# Patient Record
Sex: MALE | Race: WHITE
[De-identification: names, ages, dates, MRNs, and addresses within clinical notes are randomized per-mention and may not be internally consistent; named-entity substitution may affect disease eponyms.]

---

## 2020-01-27 ENCOUNTER — HOSPITAL ENCOUNTER (INPATIENT)
Dept: HOSPITAL 75 - ER | Age: 55
LOS: 1 days | Discharge: HOME | DRG: 378 | End: 2020-01-28
Attending: FAMILY MEDICINE | Admitting: FAMILY MEDICINE
Payer: COMMERCIAL

## 2020-01-27 VITALS — SYSTOLIC BLOOD PRESSURE: 170 MMHG | DIASTOLIC BLOOD PRESSURE: 52 MMHG

## 2020-01-27 VITALS — HEIGHT: 70 IN | BODY MASS INDEX: 23.8 KG/M2 | WEIGHT: 166.23 LBS

## 2020-01-27 VITALS — DIASTOLIC BLOOD PRESSURE: 71 MMHG | SYSTOLIC BLOOD PRESSURE: 110 MMHG

## 2020-01-27 VITALS — SYSTOLIC BLOOD PRESSURE: 106 MMHG | DIASTOLIC BLOOD PRESSURE: 67 MMHG

## 2020-01-27 VITALS — DIASTOLIC BLOOD PRESSURE: 65 MMHG | SYSTOLIC BLOOD PRESSURE: 103 MMHG

## 2020-01-27 VITALS — SYSTOLIC BLOOD PRESSURE: 92 MMHG | DIASTOLIC BLOOD PRESSURE: 73 MMHG

## 2020-01-27 VITALS — DIASTOLIC BLOOD PRESSURE: 70 MMHG | SYSTOLIC BLOOD PRESSURE: 106 MMHG

## 2020-01-27 VITALS — SYSTOLIC BLOOD PRESSURE: 100 MMHG | DIASTOLIC BLOOD PRESSURE: 71 MMHG

## 2020-01-27 VITALS — SYSTOLIC BLOOD PRESSURE: 117 MMHG | DIASTOLIC BLOOD PRESSURE: 74 MMHG

## 2020-01-27 VITALS — SYSTOLIC BLOOD PRESSURE: 103 MMHG | DIASTOLIC BLOOD PRESSURE: 70 MMHG

## 2020-01-27 VITALS — SYSTOLIC BLOOD PRESSURE: 110 MMHG | DIASTOLIC BLOOD PRESSURE: 79 MMHG

## 2020-01-27 VITALS — SYSTOLIC BLOOD PRESSURE: 113 MMHG | DIASTOLIC BLOOD PRESSURE: 71 MMHG

## 2020-01-27 DIAGNOSIS — F17.210: ICD-10-CM

## 2020-01-27 DIAGNOSIS — K21.9: ICD-10-CM

## 2020-01-27 DIAGNOSIS — F10.10: ICD-10-CM

## 2020-01-27 DIAGNOSIS — D64.9: ICD-10-CM

## 2020-01-27 DIAGNOSIS — K57.31: ICD-10-CM

## 2020-01-27 DIAGNOSIS — D62: ICD-10-CM

## 2020-01-27 DIAGNOSIS — K29.71: Primary | ICD-10-CM

## 2020-01-27 DIAGNOSIS — K63.5: ICD-10-CM

## 2020-01-27 DIAGNOSIS — Z79.1: ICD-10-CM

## 2020-01-27 DIAGNOSIS — K64.8: ICD-10-CM

## 2020-01-27 LAB
ALBUMIN SERPL-MCNC: 4 GM/DL (ref 3.2–4.5)
ALP SERPL-CCNC: 51 U/L (ref 40–136)
ALT SERPL-CCNC: 10 U/L (ref 0–55)
APTT BLD: 23 SEC (ref 24–35)
APTT PPP: YELLOW S
BACTERIA #/AREA URNS HPF: NEGATIVE /HPF
BASOPHILS # BLD AUTO: 0.1 10^3/UL (ref 0–0.1)
BASOPHILS NFR BLD AUTO: 1 % (ref 0–10)
BILIRUB SERPL-MCNC: 0.2 MG/DL (ref 0.1–1)
BILIRUB UR QL STRIP: NEGATIVE
BUN/CREAT SERPL: 25
CALCIUM SERPL-MCNC: 9.1 MG/DL (ref 8.5–10.1)
CHLORIDE SERPL-SCNC: 109 MMOL/L (ref 98–107)
CO2 SERPL-SCNC: 24 MMOL/L (ref 21–32)
CREAT SERPL-MCNC: 0.87 MG/DL (ref 0.6–1.3)
EOSINOPHIL # BLD AUTO: 0.1 10^3/UL (ref 0–0.3)
EOSINOPHIL NFR BLD AUTO: 2 % (ref 0–10)
ERYTHROCYTE [DISTWIDTH] IN BLOOD BY AUTOMATED COUNT: 18.2 % (ref 10–14.5)
FIBRINOGEN PPP-MCNC: CLEAR MG/DL
GFR SERPLBLD BASED ON 1.73 SQ M-ARVRAT: > 60 ML/MIN
GLUCOSE SERPL-MCNC: 143 MG/DL (ref 70–105)
GLUCOSE UR STRIP-MCNC: NEGATIVE MG/DL
HCT VFR BLD CALC: 25 % (ref 40–54)
HGB BLD-MCNC: 7.2 G/DL (ref 13.3–17.7)
INR PPP: 0.9 (ref 0.8–1.4)
KETONES UR QL STRIP: NEGATIVE
LEUKOCYTE ESTERASE UR QL STRIP: NEGATIVE
LYMPHOCYTES # BLD AUTO: 1.2 X 10^3 (ref 1–4)
LYMPHOCYTES NFR BLD AUTO: 16 % (ref 12–44)
MANUAL DIFFERENTIAL PERFORMED BLD QL: NO
MCH RBC QN AUTO: 22 PG (ref 25–34)
MCHC RBC AUTO-ENTMCNC: 29 G/DL (ref 32–36)
MCV RBC AUTO: 77 FL (ref 80–99)
MONOCYTES # BLD AUTO: 0.7 X 10^3 (ref 0–1)
MONOCYTES NFR BLD AUTO: 10 % (ref 0–12)
NEUTROPHILS # BLD AUTO: 5.4 X 10^3 (ref 1.8–7.8)
NEUTROPHILS NFR BLD AUTO: 72 % (ref 42–75)
NITRITE UR QL STRIP: NEGATIVE
OTHER ELEMENTS URNS MICRO: (no result) /HPF
PH UR STRIP: 5.5 [PH] (ref 5–9)
PLATELET # BLD: 411 10^3/UL (ref 130–400)
PMV BLD AUTO: 9.4 FL (ref 7.4–10.4)
POTASSIUM SERPL-SCNC: 3.7 MMOL/L (ref 3.6–5)
PROT SERPL-MCNC: 6.6 GM/DL (ref 6.4–8.2)
PROT UR QL STRIP: NEGATIVE
PROTHROMBIN TIME: 12.8 SEC (ref 12.2–14.7)
RBC #/AREA URNS HPF: (no result) /HPF
SODIUM SERPL-SCNC: 141 MMOL/L (ref 135–145)
SP GR UR STRIP: >=1.03 (ref 1.02–1.02)
WBC # BLD AUTO: 7.6 10^3/UL (ref 4.3–11)
WBC #/AREA URNS HPF: (no result) /HPF

## 2020-01-27 PROCEDURE — 88305 TISSUE EXAM BY PATHOLOGIST: CPT

## 2020-01-27 PROCEDURE — 96374 THER/PROPH/DIAG INJ IV PUSH: CPT

## 2020-01-27 PROCEDURE — 85610 PROTHROMBIN TIME: CPT

## 2020-01-27 PROCEDURE — 81000 URINALYSIS NONAUTO W/SCOPE: CPT

## 2020-01-27 PROCEDURE — 87081 CULTURE SCREEN ONLY: CPT

## 2020-01-27 PROCEDURE — 96361 HYDRATE IV INFUSION ADD-ON: CPT

## 2020-01-27 PROCEDURE — 86900 BLOOD TYPING SEROLOGIC ABO: CPT

## 2020-01-27 PROCEDURE — 85730 THROMBOPLASTIN TIME PARTIAL: CPT

## 2020-01-27 PROCEDURE — 36415 COLL VENOUS BLD VENIPUNCTURE: CPT

## 2020-01-27 PROCEDURE — 86901 BLOOD TYPING SEROLOGIC RH(D): CPT

## 2020-01-27 PROCEDURE — 80053 COMPREHEN METABOLIC PANEL: CPT

## 2020-01-27 PROCEDURE — 86850 RBC ANTIBODY SCREEN: CPT

## 2020-01-27 PROCEDURE — 85025 COMPLETE CBC W/AUTO DIFF WBC: CPT

## 2020-01-27 PROCEDURE — 86920 COMPATIBILITY TEST SPIN: CPT

## 2020-01-27 RX ADMIN — DEXTROSE MONOHYDRATE, SODIUM CHLORIDE, AND POTASSIUM CHLORIDE SCH MLS/HR: 50; 4.5; 1.49 INJECTION, SOLUTION INTRAVENOUS at 13:34

## 2020-01-27 RX ADMIN — DEXTROSE MONOHYDRATE, SODIUM CHLORIDE, AND POTASSIUM CHLORIDE SCH MLS/HR: 50; 4.5; 1.49 INJECTION, SOLUTION INTRAVENOUS at 19:59

## 2020-01-27 RX ADMIN — PANTOPRAZOLE SODIUM SCH MG: 40 INJECTION, POWDER, FOR SOLUTION INTRAVENOUS at 21:09

## 2020-01-27 RX ADMIN — NICOTINE SCH MG: 21 PATCH, EXTENDED RELEASE TRANSDERMAL at 12:35

## 2020-01-27 RX ADMIN — ASCORBIC ACID, VITAMIN A PALMITATE, CHOLECALCIFEROL, THIAMINE HYDROCHLORIDE, RIBOFLAVIN-5 PHOSPHATE SODIUM, PYRIDOXINE HYDROCHLORIDE, NIACINAMIDE, DEXPANTHENOL, ALPHA-TOCOPHEROL ACETATE, VITAMIN K1, FOLIC ACID, BIOTIN, CYANOCOBALAMIN SCH MLS/HR: 200; 3300; 200; 6; 3.6; 6; 40; 15; 10; 150; 600; 60; 5 INJECTION, SOLUTION INTRAVENOUS at 13:34

## 2020-01-27 NOTE — ED GI
General


Chief Complaint:  Abdominal/GI Problems


Stated Complaint:  WEAKNESS,BLACK STOOLS


Nursing Triage Note:  


Pt reports black stools yesterday and severe fatigue and weakness today.  Pt 


reports history of heavy whiskey drinking off and on for the past ten years.  Pt




reports drinking 1 pint of whiskey most days.  Pt denies pain.


Sepsis Screen:  No Definite Risk


Source of Information:  Patient


Exam Limitations:  No Limitations





History of Present Illness


Date Seen by Provider:  Jan 27, 2020


Time Seen by Provider:  09:07


Initial Comments


This 54-year-old gentleman presents to the emergency room feeling severely weak 

and fatigued today.  He was having difficulty ambulating stairs which is unusual

for him.  He reports melena since last night.  He has had no bright red blood 

per rectum and no hematemesis.  He denies nausea, vomiting, or diarrhea.  He has

no shortness of breath.  He denies any significant health problems except "acid 

reflux and indigestion with possible hiatal hernia".  He does admit to drinking 

alcohol daily, usually about a pint of hard alcohol.  His last alcoholic 

beverage was January 24.  He has no local primary care provider and takes no 

medications except over-the-counter Gaviscon.  Gaviscon did not help this 

morning.  He is noted to be mildly tachycardic with a systolic blood pressure in

the 90s.





Allergies and Home Medications


Allergies


Coded Allergies:  


     No Known Drug Allergies (Unverified , 8/11/10)





Home Medications


Lansoprazole 15 Mg Capsule.dr, 15 MG PO DAILY, (Reported)





Patient Home Medication List


Home Medication List Reviewed:  Yes





Review of Systems


Review of Systems


Constitutional:  see HPI


EENTM:  No Symptoms Reported


Respiratory:  No Symptoms Reported


Cardiovascular:  No Symptoms Reported


Gastrointestinal:  See HPI


Genitourinary:  No Symptoms Reported


Musculoskeletal:  no symptoms reported


Skin:  no symptoms reported


Psychiatric/Neurological:  See HPI


Endocrine:  No Symptoms Reported





Past Medical-Social-Family Hx


Past Med/Social Hx:  Reviewed and Corrections made


Patient Social History


Alcohol Use:  Regular Use


Alcohol Beverage of Choice:  Whiskey


Recreational Drug Use:  No


Smoking Status:  Current Everyday Smoker


Type Used:  Cigarettes


2nd Hand Smoke Exposure:  Yes


Recent Foreign Travel:  No


Contact w/Someone Who Travel:  No


Recent Infectious Disease Expo:  No


Recent Hopitalizations:  No





Past Medical History


Surgeries:  Yes (wisdom teeth)


Respiratory:  No


Cardiac:  No


Neurological:  No


Genitourinary:  No


Gastrointestinal:  Yes


Gastroesophageal Reflux


Musculoskeletal:  No


Endocrine:  No


HEENT:  No


Cancer:  No


Psychosocial:  No


Integumentary:  No


Blood Disorders:  No





Physical Exam


Vital Signs





Vital Signs - First Documented








 1/27/20





 09:00


 


Temp 36.7


 


Pulse 103


 


Resp 17


 


B/P (MAP) 113/86 (95)


 


Pulse Ox 100


 


O2 Delivery Room Air





Capillary Refill : Less Than 3 Seconds


Height/Weight/BMI


Height: '"


Weight: lbs. oz. kg; 23.00 BMI


Method:


General Appearance:  WD/WN


HEENT:  PERRL/EOMI, normal ENT inspection


Neck:  normal inspection


Respiratory:  lungs clear, normal breath sounds, no respiratory distress, no 

accessory muscle use


Cardiovascular:  regular rate, rhythm, no edema, no murmur


Gastrointestinal:  normal bowel sounds, non tender, soft


Extremities:  normal inspection, no pedal edema


Neurologic/Psychiatric:  CNs II-XII nml as tested, no motor/sensory deficits, 

alert, normal mood/affect, oriented x 3


Skin:  normal color, warm/dry





Progress/Results/Core Measures


Results/Orders


Lab Results





Laboratory Tests








Test


 1/27/20


09:15 1/27/20


09:35 Range/Units


 


 


White Blood Count


 7.6 


 


 4.3-11.0


10^3/uL


 


Red Blood Count


 3.26 L


 


 4.35-5.85


10^6/uL


 


Hemoglobin 7.2 L  13.3-17.7  G/DL


 


Hematocrit 25 L  40-54  %


 


Mean Corpuscular Volume 77 L  80-99  FL


 


Mean Corpuscular Hemoglobin 22 L  25-34  PG


 


Mean Corpuscular Hemoglobin


Concent 29 L


 


 32-36  G/DL





 


Red Cell Distribution Width 18.2 H  10.0-14.5  %


 


Platelet Count


 411 H


 


 130-400


10^3/uL


 


Mean Platelet Volume 9.4   7.4-10.4  FL


 


Neutrophils (%) (Auto) 72   42-75  %


 


Lymphocytes (%) (Auto) 16   12-44  %


 


Monocytes (%) (Auto) 10   0-12  %


 


Eosinophils (%) (Auto) 2   0-10  %


 


Basophils (%) (Auto) 1   0-10  %


 


Neutrophils # (Auto) 5.4   1.8-7.8  X 10^3


 


Lymphocytes # (Auto) 1.2   1.0-4.0  X 10^3


 


Monocytes # (Auto) 0.7   0.0-1.0  X 10^3


 


Eosinophils # (Auto)


 0.1 


 


 0.0-0.3


10^3/uL


 


Basophils # (Auto)


 0.1 


 


 0.0-0.1


10^3/uL


 


Prothrombin Time 12.8   12.2-14.7  SEC


 


INR Comment 0.9   0.8-1.4  


 


Activated Partial


Thromboplast Time 23 L


 


 24-35  SEC





 


Sodium Level 141   135-145  MMOL/L


 


Potassium Level 3.7   3.6-5.0  MMOL/L


 


Chloride Level 109 H    MMOL/L


 


Carbon Dioxide Level 24   21-32  MMOL/L


 


Anion Gap 8   5-14  MMOL/L


 


Blood Urea Nitrogen 22 H  7-18  MG/DL


 


Creatinine


 0.87 


 


 0.60-1.30


MG/DL


 


Estimat Glomerular Filtration


Rate > 60 


 


  





 


BUN/Creatinine Ratio 25    


 


Glucose Level 143 H    MG/DL


 


Calcium Level 9.1   8.5-10.1  MG/DL


 


Corrected Calcium 9.1   8.5-10.1  MG/DL


 


Total Bilirubin 0.2   0.1-1.0  MG/DL


 


Aspartate Amino Transf


(AST/SGOT) 11 


 


 5-34  U/L





 


Alanine Aminotransferase


(ALT/SGPT) 10 


 


 0-55  U/L





 


Alkaline Phosphatase 51     U/L


 


Total Protein 6.6   6.4-8.2  GM/DL


 


Albumin 4.0   3.2-4.5  GM/DL


 


Urine Color  YELLOW   


 


Urine Clarity  CLEAR   


 


Urine pH  5.5  5-9  


 


Urine Specific Gravity  >=1.030  1.016-1.022  


 


Urine Protein  NEGATIVE  NEGATIVE  


 


Urine Glucose (UA)  NEGATIVE  NEGATIVE  


 


Urine Ketones  NEGATIVE  NEGATIVE  


 


Urine Nitrite  NEGATIVE  NEGATIVE  


 


Urine Bilirubin  NEGATIVE  NEGATIVE  


 


Urine Urobilinogen  0.2  < = 1.0  MG/DL


 


Urine Leukocyte Esterase  NEGATIVE  NEGATIVE  


 


Urine RBC (Auto)  NEGATIVE  NEGATIVE  


 


Urine RBC  RARE   /HPF


 


Urine WBC  RARE   /HPF


 


Urine Crystals  NONE   /LPF


 


Urine Bacteria  NEGATIVE   /HPF


 


Urine Casts  NONE   /LPF


 


Urine Mucus  SMALL H  /LPF


 


Urine Other  FEW SPERM H  /HPF


 


Urine Culture Indicated  NO   








My Orders





Orders - VICK STOLL MD


Cbc With Automated Diff (1/27/20 09:07)


Comprehensive Metabolic Panel (1/27/20 09:07)


Protime With Inr (1/27/20 09:07)


Partial Thromboplastin Time (1/27/20 09:07)


Ed Iv/Invasive Line Start (1/27/20 09:07)


Ua Culture If Indicated (1/27/20 09:07)


Red Cells Leukocytes Reduced (1/27/20 09:39)


Type And Screen (1/27/20 09:39)


Ns Iv 500 Ml (Sodium Chloride 0.9%) (1/27/20 10:26)





Medications Given in ED





Vital Signs/I&O











 1/27/20





 09:00


 


Temp 36.7


 


Pulse 103


 


Resp 17


 


B/P (MAP) 113/86 (95)


 


Pulse Ox 100


 


O2 Delivery Room Air














Blood Pressure Mean:                    95











Progress


Progress Note :  


   Time:  10:40


Progress Note


Patient was found to be severely anemic with a hemoglobin of 7.2.  Because of 

his mild tachycardia, marginal blood pressure, and symptoms, it was deemed 

appropriate to transfuse.  He will be transfused one unit of packed red blood 

cells and one unit will be held.  I spoke with Dr. Gonzales who ideally would like

to prep him to day and perform both upper and lower endoscopy tomorrow.  

Protonix will be given in the ER.  Patient is being admitted to the hospital 

service at Dr. Gonzales's request.





Departure


Communication (Admissions)


Time/Spoke to Admitting Phy:  10:35


Dr. Barajas


Time/Spoke to Consulting Phy:  10:15


Dr. Gonzales





Impression





   Primary Impression:  


   Severe anemia


   Additional Impressions:  


   Melena


   Generalized weakness


Disposition:  09 ADMITTED AS INPATIENT


Condition:  Stable





Admissions


Decision to Admit Reason:  Admit from ER (General)


Decision to Admit/Date:  Jan 27, 2020


Time/Decision to Admit Time:  10:15





Departure-Patient Inst.


Referrals:  


NO,LOCAL PHYSICIAN (PCP/Family)


Primary Care Physician











VICK STOLL MD        Jan 27, 2020 10:32

## 2020-01-27 NOTE — NUR
SPOKE WITH THE PT TO UPDATE THE MED REC.



PT SAYS HE DOES NOT TAKE ANY PRESCRIPTION MEDICATIONS AND THE ONLY THING HE TAKES ARE OTC'S.



OTC MEDS:

PRILOSEC

IBUPROFEN

## 2020-01-27 NOTE — NUR
THERE WAS AN ERROR IN ADMISSION PART A VITALS THAT WERE CHARTED. 

THE CORRECTED VITALS ARE:

BLOOD PRESSURE 106/67  RIGHT ARM

HEART RATE 96

TEMP. 36.0 C

RESP  18

SPO2 98&

PAIN 0

## 2020-01-27 NOTE — NUR
ANABEL GUTHRIE admitted to room 417-1, with an admitting diagnosis of MELENA AND SEVERE 
ANEMIA, on 01/27/20 from ED via [facility], accompanied by ED STAFF. ANABEL GUTHRIE introduced 
to surroundings, call light, bed controls, phone, TV, temperature control, lights, meal 
times, smoking policy, visitor policy, side rail policy, bathrooms and showers.  Patient 
Rights given to patient in the handbook. ANABEL GUTHRIE verbalizes understanding that [facility] is not responsible for the loss or damage to any personal effects or valuables that 
are kept in the patients possession during their hospitalization.  The following Patient 
Care Plans were discussed with the PATIENT: Discharge Planning, GASTROINTESTINAL BLEED, 
ANEMIA and KNOWLEDGE DEFICIT. ANABEL GUTHRIE verbalizes understanding of Interdisciplinary 
Patient Education. Patient and/or family were informed about the Rapid Response Team and its 
purpose.

## 2020-01-27 NOTE — HISTORY & PHYSICAL-HOSPITALIST
History of Present Illness


HPI/Chief Complaint


Patient is 54-year-old  male with past medical history of daily alcohol

use who presented to the emergency department with chief complaint of low energy

and black stools.  He states he has had black stools in the past but has never 

had this worked up.  He noticed that his stool yesterday morning was black.  He 

was fatigued at that time but upon awakening this morning could hardly take a 

few steps due to the fatigue.  He has since had another black stool.  Found to 

have a hemoglobin of 7 and was admitted to the hospital for GI bleed.  He 

believes he may be slightly pale but is unsure.


Source:  patient


Exam Limitations:  no limitations


Date Seen


20


Time Seen by a Provider:  15:33


Attending Physician


Samuel Barajas MD


PCP


No,Local Physician


Referring Physician





Date of Admission


2020 at 10:33





Home Medications & Allergies


Home Medications


Reviewed patient Home Medication Reconciliation performed by pharmacy medication

reconciliations technician and/or nursing.


Patients Allergies have been reviewed.





Allergies





Allergies


Coded Allergies


  No Known Drug Allergies (Unverified8/11/10)








Past Medical-Social-Family Hx


Past Med/Social Hx:  Reviewed Nursing Past Med/Soc Hx, Reviewed and Corrections 

made


Patient Social History


Alcohol Use:  Regular Use


Alcohol Beverage of Choice:  Whiskey


Recreational Drug Use:  No


Smoking Status:  Current Everyday Smoker


Type Used:  Cigarettes


2nd Hand Smoke Exposure:  Yes


Recent Foreign Travel:  No


Contact w/other who traveled:  No


Recent Hopitalizations:  No


Recent Infectious Disease Expo:  No





Past Medical History


Gastrointestinal:  Gastroesophageal Reflux


History of Blood Disorders:  No





Family History





Cataracts


  19 MOTHER


Deafness or hearing loss


  19 FATHER


Diabetes mellitus


  19 FATHER


Headache disorder


  19 MOTHER


Diabetes (Father), Other Conditions/Hx (Mother had Hiatal hernia surgery)





Review of Systems


Constitutional:  malaise, weakness


EENTM:  no symptoms reported


Cardiovascular:  no symptoms reported


Gastrointestinal:  No hematemesis; heartburn, melena; No nausea, No vomiting


Genitourinary:  no symptoms reported


Musculoskeletal:  no symptoms reported


Skin:  see HPI


Psychiatric/Neurological:  No Symptoms Reported





Physical Exam


Physical Exam


Vital Signs





Vital Signs - First Documented








 20





 09:00


 


Temp 36.7


 


Pulse 103


 


Resp 17


 


B/P (MAP) 113/86 (95)


 


Pulse Ox 100


 


O2 Delivery Room Air





Capillary Refill : Less Than 3 Seconds


Height, Weight, BMI


Height: '"


Weight: lbs. oz. kg; 23.85 BMI


Method:


General Appearance:  No Apparent Distress, WD/WN


Eyes:  Bilateral Eye Conjunctivae Pale


HEENT:  Moist Mucous Membranes; No Scleral Icterus (L), No Scleral Icterus (R)


Neck:  Normal Inspection, Supple


Respiratory:  Lungs Clear, No Accessory Muscle Use, No Respiratory Distress


Cardiovascular:  Regular Rate, Rhythm, No Murmur


Gastrointestinal:  Normal Bowel Sounds, Non Tender, Soft


Extremity:  Normal Capillary Refill, No Calf Tenderness, No Pedal Edema


Neurologic/Psychiatric:  Alert, Oriented x3, Normal Mood/Affect; No Aphasia, No 

Facial Droop


Skin:  Warm/Dry, Pallor; No Petechia





Results


Results/Procedures


Labs


Laboratory Tests


20 09:15








20 05:09








Patient resulted labs reviewed.





Assessment/Plan


Admission Diagnosis


GI Bleed


Admission Status:  Inpatient Order (span 2 midnights)


Reason for Inpatient Admission:  


severe anemia, blood transfusion, needs colonoscopy





Assessment and Plan


GI Bleed


   s/p 1 unit pRBCs


   Goal Hgb >8 since continuing to bleed


   Surgery consulted, appreciate recs


   Plan for scope tomorrow


   Hemodynamically stable at this time





Alcohol abuse


   CIWA protocol


   No history of withdrawal


   Drinks 1 pt/night





Tobacco abuse


   Recommended cessation


   Was able to quit for 9 years but has been smoking for 13 years now


   Nicotine patch





Diagnosis/Problems


Diagnosis/Problems





(1) Tobacco abuse


Status:  Chronic


(2) Alcohol abuse


Status:  Chronic


(3) GI bleed


Status:  Acute


Qualifiers:  


   GI bleed type/associated pathology:  unspecified gastrointestinal hemorrhage 

type  Qualified Codes:  K92.2 - Gastrointestinal hemorrhage, unspecified


(4) Severe anemia


Status:  Acute


(5) Melena


Status:  Acute


(6) Generalized weakness


Status:  Acute





Clinical Quality Measures


DVT/VTE Risk/Contraindication:


Risk Factor Score Per Nursin


RFS Level Per Nursing on Admit:  2=Moderate











SAMUEL BARAJAS MD              2020 15:23

## 2020-01-27 NOTE — CONSULTATION - SURGERY
History of Present Illness


History of Present Illness


Patient Consulted On(byron/time)


1/27/20


 13:55


Time Seen by Provider:  13:24


History of Present Illness


Surgery asked to consult regarding Anemia and Melena.





HPI per ED:  Pt reports black stools yesterday and severe fatigue and weakness 

today.  Pt reports history of heavy whiskey drinking off and on for the past ten

years.  Pt reports drinking 1 pint of whiskey most days.  Pt denies pain.





This 54-year-old gentleman presents to the emergency room feeling severely weak 

and fatigued today.  He was having difficulty ambulating stairs which is unusual

for him.  He reports melena since last night.  He has had no bright red blood 

per rectum and no hematemesis.  He denies nausea, vomiting, or diarrhea.  He has

no shortness of breath.  He denies any significant health problems except "acid 

reflux and indigestion with possible hiatal hernia".  He does admit to drinking 

alcohol daily, usually about a pint of hard alcohol.  His last alcoholic 

beverage was January 24.  He has no local primary care provider and takes no 

medications except over-the-counter Gaviscon.  Gaviscon did not help this 

morning.  He is noted to be mildly tachycardic with a systolic blood pressure in

the 90s.





When I spoke to pt this afternoon he states he has ever had black BM's  before 

this and doesn't remember a time when he felt this weak.  States he had a 

colonoscopy and Barium swallow in his 30's because "of acid reflux."  Pt denies 

abdominal pain.





Allergies and Home Medications


Allergies


Coded Allergies:  


     No Known Drug Allergies (Unverified , 8/11/10)





Home Medications


Ibuprofen 200 Mg Tablet, 400 MG PO Q8H PRN for PAIN-MILD (1-4), (Reported)


Lansoprazole 15 Mg Capsule.dr, 15 MG PO DAILY, (Reported)





Patient Home Medication List


Home Medication List Reviewed:  Yes





Past Medical-Social-Family Hx


Patient Social History


Alcohol Use:  Regular Use


Recreational Drug Use:  No


Smoking Status:  Current Everyday Smoker


Type Used:  Cigarettes


2nd Hand Smoke Exposure:  Yes


Recent Foreign Travel:  No


Contact w/Someone Who Travel:  No


Recent Infectious Disease Expo:  No


Recent Hopitalizations:  No





Surgeries


History of Surgeries:  Yes (wisdom teeth)





Respiratory


History of Respiratory Disorde:  No





Cardiovascular


History of Cardiac Disorders:  No





Neurological


History of Neurological Disord:  No





Genitourinary


History of Genitourinary Disor:  No





Gastrointestinal


History of Gastrointestinal Di:  Yes


Gastrointestinal Disorders:  Gastroesophageal Reflux





Musculoskeletal


History of Musculoskeletal Dis:  No





Endocrine


History of Endocrine Disorders:  No





HEENT


History of HEENT Disorders:  No





Cancer


History of Cancer:  No





Psychosocial


History of Psychiatric Problem:  No





Integumentary


History of Skin or Integumenta:  No





Blood Transfusions


History of Blood Disorders:  No





Family Medical History


Significant Family History:  Diabetes (Father), Other Conditions/Hx (Mother had 

Hiatal hernia surgery)





Review of Systems-General


Constitutional:  malaise, weakness


EENTM:  No blurred vision, No double vision, No mouth pain, No mouth swelling, 

No epistaxis, No throat swelling


Respiratory:  No cough; dyspnea on exertion; No hemoptysis


Cardiovascular:  No chest pain, No edema, No palpitations


Gastrointestinal:  No abdominal pain, No jaundice; melena; No nausea, No 

vomiting


Genitourinary:  No dysuria, No frequency, No hematuria


Musculoskeletal:  No joint pain, No joint swelling, No muscle stiffness


Skin:  No change in color, No change in hair/nails


Psychiatric/Neurological:  Denies Anxiety, Denies Depressed, Denies Seizure, 

Denies Tremors


Other


pt denies any hx of abnormal bleeding or bruising.





Physical Exam-General Problems


Physical Exam


Vital Signs





Vital Signs - First Documented








 1/27/20





 09:00


 


Temp 36.7


 


Pulse 103


 


Resp 17


 


B/P (MAP) 113/86 (95)


 


Pulse Ox 100


 


O2 Delivery Room Air





Capillary Refill : Less Than 3 Seconds


General Appearance:  WD/WN, no apparent distress


Eyes:  Bilateral Eye PERRL, Bilateral Eye EOMI


HEENT:  pharynx normal; No scleral icterus (R), No scleral icterus (L)


Neck:  non-tender, full range of motion, supple


Respiratory:  chest non-tender, lungs clear, normal breath sounds, no 

respiratory distress, no accessory muscle use


Cardiovascular:  regular rate, rhythm, no edema, no murmur


Gastrointestinal:  normal bowel sounds, non tender, soft, no organomegaly, no 

pulsatile mass


Back:  no CVA tenderness, no vertebral tenderness


Extremities:  normal range of motion, non-tender, normal inspection, no pedal 

edema, no calf tenderness, normal capillary refill


Neurologic/Psychiatric:  CNs II-XII nml as tested, no motor/sensory deficits, 

alert, normal mood/affect, oriented x 3


Skin:  normal color, warm/dry


Lymphatic:  no adenopathy (neck, axilla or groin)





Data Review


Labs


Laboratory Tests


1/27/20 09:15: 


White Blood Count 7.6, Red Blood Count 3.26L, Hemoglobin 7.2L, Hematocrit 25L, 

Mean Corpuscular Volume 77L, Mean Corpuscular Hemoglobin 22L, Mean Corpuscular 

Hemoglobin Concent 29L, Red Cell Distribution Width 18.2H, Platelet Count 411H, 

Mean Platelet Volume 9.4, Neutrophils (%) (Auto) 72, Lymphocytes (%) (Auto) 16, 

Monocytes (%) (Auto) 10, Eosinophils (%) (Auto) 2, Basophils (%) (Auto) 1, Francine

trophils # (Auto) 5.4, Lymphocytes # (Auto) 1.2, Monocytes # (Auto) 0.7, 

Eosinophils # (Auto) 0.1, Basophils # (Auto) 0.1, Prothrombin Time 12.8, INR 

Comment 0.9, Activated Partial Thromboplast Time 23L, Sodium Level 141, 

Potassium Level 3.7, Chloride Level 109H, Carbon Dioxide Level 24, Anion Gap 8, 

Blood Urea Nitrogen 22H, Creatinine 0.87, Estimat Glomerular Filtration Rate > 

60, BUN/Creatinine Ratio 25, Glucose Level 143H, Calcium Level 9.1, Corrected 

Calcium 9.1, Total Bilirubin 0.2, Aspartate Amino Transf (AST/SGOT) 11, Alanine 

Aminotransferase (ALT/SGPT) 10, Alkaline Phosphatase 51, Total Protein 6.6, 

Albumin 4.0


1/27/20 09:35: 


Urine Color YELLOW, Urine Clarity CLEAR, Urine pH 5.5, Urine Specific Gravity 

>=1.030, Urine Protein NEGATIVE, Urine Glucose (UA) NEGATIVE, Urine Ketones 

NEGATIVE, Urine Nitrite NEGATIVE, Urine Bilirubin NEGATIVE, Urine Urobilinogen 

0.2, Urine Leukocyte Esterase NEGATIVE, Urine RBC (Auto) NEGATIVE, Urine RBC 

RARE, Urine WBC RARE, Urine Crystals NONE, Urine Bacteria NEGATIVE, Urine Casts 

NONE, Urine Mucus SMALLH, Urine Other FEW SPERMH, Urine Culture Indicated NO





Assessment/Plan


Anemia 


Melena





Pt is getting IV fluids, received one unit of PRBC, anti-emetics, pain control 

as needed.  Prep for colonoscopy started now.  Plan is EGD/Colonoscopy tomorrow 

around noon.  Discussed the procedure with


pt; including risks and complications not limited to pain, bleeding, infection, 

intestinal perforation and esophageal perforation.  All questions answered to 

his satisfaction.  Will obtain consent.











LETICIA SALDANA DO               Jan 27, 2020 14:03

## 2020-01-27 NOTE — NUR
Delay in second set of vitals as spacelab would not take blood pressure.  This 
nurse had to obtain portable vital machine from fast track.

## 2020-01-28 VITALS — SYSTOLIC BLOOD PRESSURE: 98 MMHG | DIASTOLIC BLOOD PRESSURE: 52 MMHG

## 2020-01-28 VITALS — SYSTOLIC BLOOD PRESSURE: 99 MMHG | DIASTOLIC BLOOD PRESSURE: 57 MMHG

## 2020-01-28 VITALS — SYSTOLIC BLOOD PRESSURE: 113 MMHG | DIASTOLIC BLOOD PRESSURE: 72 MMHG

## 2020-01-28 VITALS — DIASTOLIC BLOOD PRESSURE: 73 MMHG | SYSTOLIC BLOOD PRESSURE: 116 MMHG

## 2020-01-28 VITALS — SYSTOLIC BLOOD PRESSURE: 101 MMHG | DIASTOLIC BLOOD PRESSURE: 63 MMHG

## 2020-01-28 VITALS — DIASTOLIC BLOOD PRESSURE: 67 MMHG | SYSTOLIC BLOOD PRESSURE: 101 MMHG

## 2020-01-28 VITALS — DIASTOLIC BLOOD PRESSURE: 54 MMHG | SYSTOLIC BLOOD PRESSURE: 90 MMHG

## 2020-01-28 VITALS — DIASTOLIC BLOOD PRESSURE: 72 MMHG | SYSTOLIC BLOOD PRESSURE: 113 MMHG

## 2020-01-28 LAB
ALBUMIN SERPL-MCNC: 3.6 GM/DL (ref 3.2–4.5)
ALP SERPL-CCNC: 42 U/L (ref 40–136)
ALT SERPL-CCNC: 12 U/L (ref 0–55)
BASOPHILS # BLD AUTO: 0 10^3/UL (ref 0–0.1)
BASOPHILS NFR BLD AUTO: 1 % (ref 0–10)
BILIRUB SERPL-MCNC: 0.3 MG/DL (ref 0.1–1)
BUN/CREAT SERPL: 10
CALCIUM SERPL-MCNC: 8.3 MG/DL (ref 8.5–10.1)
CHLORIDE SERPL-SCNC: 111 MMOL/L (ref 98–107)
CO2 SERPL-SCNC: 17 MMOL/L (ref 21–32)
CREAT SERPL-MCNC: 0.8 MG/DL (ref 0.6–1.3)
EOSINOPHIL # BLD AUTO: 0.2 10^3/UL (ref 0–0.3)
EOSINOPHIL NFR BLD AUTO: 3 % (ref 0–10)
ERYTHROCYTE [DISTWIDTH] IN BLOOD BY AUTOMATED COUNT: 18.1 % (ref 10–14.5)
GFR SERPLBLD BASED ON 1.73 SQ M-ARVRAT: > 60 ML/MIN
GLUCOSE SERPL-MCNC: 100 MG/DL (ref 70–105)
HCT VFR BLD CALC: 26 % (ref 40–54)
HGB BLD-MCNC: 7.7 G/DL (ref 13.3–17.7)
LYMPHOCYTES # BLD AUTO: 1.5 X 10^3 (ref 1–4)
LYMPHOCYTES NFR BLD AUTO: 26 % (ref 12–44)
MANUAL DIFFERENTIAL PERFORMED BLD QL: NO
MCH RBC QN AUTO: 23 PG (ref 25–34)
MCHC RBC AUTO-ENTMCNC: 30 G/DL (ref 32–36)
MCV RBC AUTO: 78 FL (ref 80–99)
MONOCYTES # BLD AUTO: 0.7 X 10^3 (ref 0–1)
MONOCYTES NFR BLD AUTO: 12 % (ref 0–12)
NEUTROPHILS # BLD AUTO: 3.5 X 10^3 (ref 1.8–7.8)
NEUTROPHILS NFR BLD AUTO: 60 % (ref 42–75)
PLATELET # BLD: 346 10^3/UL (ref 130–400)
PMV BLD AUTO: 9.7 FL (ref 7.4–10.4)
POTASSIUM SERPL-SCNC: 3.9 MMOL/L (ref 3.6–5)
PROT SERPL-MCNC: 6 GM/DL (ref 6.4–8.2)
SODIUM SERPL-SCNC: 137 MMOL/L (ref 135–145)
WBC # BLD AUTO: 5.9 10^3/UL (ref 4.3–11)

## 2020-01-28 PROCEDURE — 0DB48ZX EXCISION OF ESOPHAGOGASTRIC JUNCTION, VIA NATURAL OR ARTIFICIAL OPENING ENDOSCOPIC, DIAGNOSTIC: ICD-10-PCS | Performed by: SURGERY

## 2020-01-28 PROCEDURE — 0DBN8ZZ EXCISION OF SIGMOID COLON, VIA NATURAL OR ARTIFICIAL OPENING ENDOSCOPIC: ICD-10-PCS | Performed by: SURGERY

## 2020-01-28 PROCEDURE — 0DB78ZX EXCISION OF STOMACH, PYLORUS, VIA NATURAL OR ARTIFICIAL OPENING ENDOSCOPIC, DIAGNOSTIC: ICD-10-PCS | Performed by: SURGERY

## 2020-01-28 PROCEDURE — 0DB68ZX EXCISION OF STOMACH, VIA NATURAL OR ARTIFICIAL OPENING ENDOSCOPIC, DIAGNOSTIC: ICD-10-PCS | Performed by: SURGERY

## 2020-01-28 RX ADMIN — DEXTROSE MONOHYDRATE, SODIUM CHLORIDE, AND POTASSIUM CHLORIDE SCH MLS/HR: 50; 4.5; 1.49 INJECTION, SOLUTION INTRAVENOUS at 04:18

## 2020-01-28 RX ADMIN — DEXTROSE MONOHYDRATE, SODIUM CHLORIDE, AND POTASSIUM CHLORIDE SCH MLS/HR: 50; 4.5; 1.49 INJECTION, SOLUTION INTRAVENOUS at 13:56

## 2020-01-28 RX ADMIN — NICOTINE SCH MG: 21 PATCH, EXTENDED RELEASE TRANSDERMAL at 08:14

## 2020-01-28 RX ADMIN — PANTOPRAZOLE SODIUM SCH MG: 40 INJECTION, POWDER, FOR SOLUTION INTRAVENOUS at 08:14

## 2020-01-28 RX ADMIN — ASCORBIC ACID, VITAMIN A PALMITATE, CHOLECALCIFEROL, THIAMINE HYDROCHLORIDE, RIBOFLAVIN-5 PHOSPHATE SODIUM, PYRIDOXINE HYDROCHLORIDE, NIACINAMIDE, DEXPANTHENOL, ALPHA-TOCOPHEROL ACETATE, VITAMIN K1, FOLIC ACID, BIOTIN, CYANOCOBALAMIN SCH MLS/HR: 200; 3300; 200; 6; 3.6; 6; 40; 15; 10; 150; 600; 60; 5 INJECTION, SOLUTION INTRAVENOUS at 08:41

## 2020-01-28 NOTE — OPERATIVE REPORT
DATE OF SERVICE:  01/28/2020



PREOPERATIVE DIAGNOSES:

Anemia and melena.



POSTOPERATIVE DIAGNOSES:

1.  Gastritis, questionable gastric ulcer, small hiatal hernia.

2.  Diverticula.

3.  Internal hemorrhoids.

4.  Colon polyp.



PROCEDURE:

1.  EGD with biopsy.

2.  Colonoscopy with snare polypectomy.



SURGEON:

Pankaj Gonzales DO.



FIRST ASSISTANT:

None.



ANESTHESIA:

IV sedation by CRNA.



SPECIMEN:

Biopsy from the antrum, body of stomach and GE junction as well as then a snare

polypectomy, 2 polyps removed from the sigmoid colon.



BLOOD LOSS:

Scant.



FLUIDS:

Per anesthesia.



POSTOPERATIVE CONDITION:

Stable.



INDICATION FOR PROCEDURE:

The patient is a 54-year-old male who came in and found to be anemic, hemoglobin

in the 7 range.  He also had some melena and needed a workup.



FINDINGS:

The patient had some mild gastritis and some esophagitis, small hiatal hernia

and what looked to be like possibly an old gastric ulcer with no signs of

bleeding.  In the colon, he had some diverticula, 2 small polyps in the sigmoid

colon and some very small internal hemorrhoids.



PROCEDURE NOTE:

After informed consent was obtained, the patient was brought to the endoscopy

suite, placed in bed in left lateral decubitus position.  He was administered IV

sedation by the CRNA who then monitored his vitals the entire time, heart rate,

blood pressure, pulse ox and the scope was inserted, started with the EGD,

placed the scope down the mouth through the esophagus into the stomach, noted

some mild gastritis, pushed into the duodenum.  Duodenum looked fine.  Pulled

back and did a biopsy of the antrum.  Retroflexed the scope, saw a small hiatal

hernia, did a biopsy of the body of stomach and then saw what looked like an

ulcer, took a picture of this.  The biopsy the body of stomach, looked like

there may have been an ulcer as well.  Pulled the scope into the GE junction,

did a biopsy of the GE junction and then pushed the scope back into the

esophagus.  Suctioned the air out and then pulled the scope up the esophagus and

out the mouth.  Switched gloves, switched cameras, went down below, started the

colonoscopy.  Pushed all the way into about 150 cm, able to get to the cecum,

took a picture of appendiceal orifice.  On the way in, noted some diverticula,

took pictures of these and then once in the cecum, slowly withdrew the scope

insufflating to look circumferentially at the walls looking the cecum, up the

ascending colon to the hepatic flexure, down the transverse colon, the splenic

flexure, into the descending colon and finally into the sigmoid.  In the

sigmoid, saw 2 small polyps, elected to do snare polypectomy, completely removed

these and then into the rectal vault and retroflexed in the rectal vault, saw

some minimal internal hemorrhoids, took a picture of this and then removed the

scope.  The patient tolerated the procedure, recovered in endoscopy suite.





Job ID: [id number]

DocumentID: [id number]

Dictated Date:  01/28/2020 14:28:52

Transcription Date: 01/28/2020 19:52:50

Dictated By: PANKAJ GONZALES DO

## 2020-01-28 NOTE — PROGRESS NOTE - SURGERY
Subjective


Time Seen by a Provider:  08:24


Subjective/Events-last exam


Pt seen and examined, denies abdominal pain but thinks he still sees some black 

BM's.


Review of Systems


General:  No Chills, No Night Sweats


Pulmonary:  No Dyspnea, No Cough


Gastrointestinal:  No: Nausea, Vomiting, Abdominal Pain





Objective


Exam





Vital Signs








  Date Time  Temp Pulse Resp B/P (MAP) Pulse Ox O2 Delivery O2 Flow Rate FiO2


 


20 08:00 36.8 90 16 99/57 (71) 93 Room Air  


 


20 08:00      Room Air  


 


20 07:00  82      


 


20 03:40 37.2 83 14 116/73 (87) 99 Room Air  


 


20 01:00  92      


 


20 00:28 36.5 92 16 101/67 (78) 97 Room Air  


 


20 20:26  110      


 


20 20:00 36.4 98 18 113/71 (85) 98 Room Air  


 


20 20:00      Room Air  


 


20 19:00  100      


 


20 16:00 37.2 94 18 110/71 (84) 96 Room Air  


 


20 14:53 36.2 80 18 170/52 96 Room Air  


 


20 13:30 36.6 100 18 106/70 99 Room Air  


 


20 12:43  102      


 


20 12:00     96 Room Air  


 


20 12:00 36.7 96 18 106/67 (80) 98 Room Air  


 


20 11:30 36.8 98 10 110/79 99   


 


20 11:30 36.8 98 10 110/79 (89) 99 Room Air  


 


20 11:20 36.8 98 15 103/70 98 Room Air  


 


20 11:05 36.8 100 13 103/65 98 Room Air  


 


20 11:00 36.8 101 16 92/73 97 Room Air  


 


20 10:55 36.8 100 14 100/71 99 Room Air  


 


20 10:41 36.8 83 14 117/74 98 Room Air  














I & O 


 


 20





 07:00


 


Intake Total 3900 ml


 


Output Total 1220 ml


 


Balance 2680 ml





Capillary Refill : Less Than 3 Seconds


General Appearance:  No Apparent Distress, WD/WN


HEENT:  Moist Mucous Membranes; No Scleral Icterus (L), No Scleral Icterus (R)


Respiratory:  Lungs Clear, No Accessory Muscle Use, No Respiratory Distress


Cardiovascular:  Regular Rate, Rhythm, No Murmur


Gastrointestinal:  normal bowel sounds, non tender, soft, no organomegaly, no 

pulsatile mass


Skin:  Warm/Dry, Pallor





Results


Lab


Laboratory Tests


20 05:09: 


White Blood Count 5.9, Red Blood Count 3.32L, Hemoglobin 7.7L, Hematocrit 26L, 

Mean Corpuscular Volume 78L, Mean Corpuscular Hemoglobin 23L, Mean Corpuscular 

Hemoglobin Concent 30L, Red Cell Distribution Width 18.1H, Platelet Count 346, 

Mean Platelet Volume 9.7, Neutrophils (%) (Auto) 60, Lymphocytes (%) (Auto) 26, 

Monocytes (%) (Auto) 12, Eosinophils (%) (Auto) 3, Basophils (%) (Auto) 1, 

Neutrophils # (Auto) 3.5, Lymphocytes # (Auto) 1.5, Monocytes # (Auto) 0.7, 

Eosinophils # (Auto) 0.2, Basophils # (Auto) 0.0, Sodium Level 137, Potassium 

Level 3.9, Chloride Level 111H, Carbon Dioxide Level 17L, Anion Gap 9, Blood 

Urea Nitrogen 8, Creatinine 0.80, Estimat Glomerular Filtration Rate > 60, 

BUN/Creatinine Ratio 10, Glucose Level 100, Calcium Level 8.3L, Corrected 

Calcium 8.6, Total Bilirubin 0.3, Aspartate Amino Transf (AST/SGOT) 15, Alanine 

Aminotransferase (ALT/SGPT) 12, Alkaline Phosphatase 42, Total Protein 6.0L, 

Albumin 3.6





Assessment/Plan


Anemia 


Melena





Pt did prep for colonoscopy and plan is EGD/Colonoscopy  around noon.  Discussed

the procedure with pt; including risks and complications not limited to pain, 

bleeding, infection, 


intestinal perforation and esophageal perforation.  All questions answered to 

his satisfaction.





Clinical Quality Measures


DVT/VTE Risk/Contraindication:


Risk Factor Score Per Nursin


RFS Level Per Nursing on Admit:  2=Moderate


Other:  


GI BLEED











LETICIA SALDANA DO               2020 10:13

## 2020-01-28 NOTE — DISCHARGE SUMMARY
Diagnosis/Chief Complaint


Date of Admission


2020 at 10:33


Date of Discharge





Discharge Date:  2020


Admission Diagnosis


GI Bleed


Primary Care


No,Local Physician


Discharge Diagnosis





(1) Tobacco abuse


Status:  Chronic


(2) Alcohol abuse


Status:  Chronic


(3) GI bleed


Status:  Acute


(4) Severe anemia


Status:  Acute


(5) Melena


Status:  Acute


(6) Generalized weakness


Status:  Acute





Discharge Summary


Procedures/Consulations


Dr Gonzales





Discharge Physical Exam


Allergies:  


Coded Allergies:  


     No Known Drug Allergies (Unverified , 8/11/10)


Vitals & I&Os





Vital Signs








  Date Time  Temp Pulse Resp B/P (MAP) Pulse Ox O2 Delivery O2 Flow Rate FiO2


 


20 17:07 36.3 84 16 113/72 100 Room Air  








General Appearance:  No Apparent Distress, WD/WN


Respiratory:  Lungs Clear, No Respiratory Distress


Cardiovascular:  Regular Rate, Rhythm, No Murmur


Neurologic/Psychiatric:  Alert, Oriented x3





Hospital Course


Patient was admitted due to GI bleed and anemia.  He was transfused 1 unit 

throughout this admission due to hemoglobin of 7 and active GI bleed.  His 

hemoglobin then stabilized.  He underwent a bowel prep and underwent EGD and 

colonoscopy.  This revealed gastritis with gastric ulcer.  He was started on a 

PPI IV while in the hospital and continued on this on discharge.  He is follow-

up with his primary care physician within the next week.  He had an otherwise 

uncomplicated hospital stay and was discharged home in stable condition.


Labs (last 24 hrs)


Laboratory Tests


20 15:51: Lab Scanned Report Transfusion Reaction Form





Microbiology


20 MRSA Screen - Final, Complete


          MRSA not isolated


Patient resulted labs reviewed.


Pending Labs








Discussion & Recommendations


Discharge Planning:  <30 minutes discharge planning





Discharge


Home Medications:





Active Scripts


Active


Reported


Lansoprazole 15 Mg Capsule. 15 Mg PO DAILY





Instructions to patient/family


Please see electronic discharge instructions given to patient.





Clinical Quality Measures


DVT/VTE Risk/Contraindication:


Risk Factor Score Per Nursin


RFS Level Per Nursing on Admit:  2=Moderate


Other:  


GI BLEED





Problem Qualifiers





(1) GI bleed:  


GI bleed type/associated pathology:  unspecified gastrointestinal hemorrhage 

type  Qualified Codes:  K92.2 - Gastrointestinal hemorrhage, unspecified








SAMUEL WHITE MD              2020 10:01

## 2020-01-28 NOTE — DISCHARGE INST-SIMPLE/STANDARD
Discharge Inst-Standard


Patient Instructions/Follow Up


Plan of Care/Instructions/FU:  


Please continue to take your medications as written. Please follow up with


your primary care doctor in the next week and with Dr Gonzales as


recommended.


Activity as Tolerated:  Yes


Discharge Diet:  Other Diet (Lake diet )


Return to The Hospital For:  


Dark stools, weakness, fatigue, chest pain, heart racing, if you feel you


are getting worse.





Planned Outpatient Orders/Ref.


Pneu Vac Indicated:  Yes











SAMUEL WHITE MD              Jan 28, 2020 09:57

## 2020-01-30 NOTE — PHYSICIAN QUERY CLARIFICATION
PQ-Further Specificity


Admission/Discharge


Admission Date: Jan 27, 2020 at 10:33 


Discharge Date:  Jan 28, 2020 at 18:09





The medical record reflects the following clinical scenario:





History/Risk Factors:


Low energy, Black stools





Clinical Findings:


Acute anemia, Hgb-7.2 Hct 25.0





Treatment:


1 unit PRBC





Question:  Can you further specify [Acute anemia] per the clinical indicators 

above? 


Please document a response in the Progress Notes or Discharge Summary.





1. Acute blood loss anemia





2. Chronic blood loss anemia





3. Other, with explanation of the clinical findings.





4. Clinically undetermined, no explanation for the clinical findings.





PHYSICIAN RESPONSE


Can you specify per above:  1


Please remember a lack of response to the above will prompt a phone page by 

CDI/Coding staff. 





In responding to this query, please exercise your independent professional 

judgment.  The purpose of this communication is to more accurately reflect the 

complexity of your patients condition. The fact that a question is asked does 

not imply that any particular answer is desired or expected.  





Thank you for your timely response to this clarification.      


   


Requestors name: Asjordyntamika   





Phone # [phone]








THIS PHYSICIAN QUERY FORM IS A PERMANENT PART OF THE MEDICAL RECORD








CARLITOS GONZALEZ








<Created by CARLITOS GONZALEZ>











DICK URBINA                   Jan 30, 2020 06:43


SUSIE KELLEY                 Feb 19, 2020 07:13


SAMUEL WHITE MD              Feb 20, 2020 10:40

## 2022-05-22 ENCOUNTER — HOSPITAL ENCOUNTER (EMERGENCY)
Dept: HOSPITAL 75 - ER | Age: 57
Discharge: HOME | End: 2022-05-22
Payer: COMMERCIAL

## 2022-05-22 VITALS — SYSTOLIC BLOOD PRESSURE: 105 MMHG | DIASTOLIC BLOOD PRESSURE: 72 MMHG

## 2022-05-22 VITALS — WEIGHT: 160.94 LBS | HEIGHT: 69.69 IN | BODY MASS INDEX: 23.3 KG/M2

## 2022-05-22 DIAGNOSIS — R10.11: ICD-10-CM

## 2022-05-22 DIAGNOSIS — F17.210: ICD-10-CM
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
## 2022-05-22 DIAGNOSIS — S22.41XA: Primary | ICD-10-CM

## 2022-05-22 LAB
ALBUMIN SERPL-MCNC: 4.3 GM/DL (ref 3.2–4.5)
ALP SERPL-CCNC: 48 U/L (ref 40–136)
ALT SERPL-CCNC: 15 U/L (ref 0–55)
BASOPHILS # BLD AUTO: 0.1 10^3/UL (ref 0–0.1)
BASOPHILS NFR BLD AUTO: 1 % (ref 0–10)
BILIRUB SERPL-MCNC: 0.4 MG/DL (ref 0.1–1)
BUN/CREAT SERPL: 13
CALCIUM SERPL-MCNC: 9.4 MG/DL (ref 8.5–10.1)
CHLORIDE SERPL-SCNC: 106 MMOL/L (ref 98–107)
CO2 SERPL-SCNC: 19 MMOL/L (ref 21–32)
CREAT SERPL-MCNC: 0.86 MG/DL (ref 0.6–1.3)
EOSINOPHIL # BLD AUTO: 0 10^3/UL (ref 0–0.3)
EOSINOPHIL NFR BLD AUTO: 0 % (ref 0–10)
GFR SERPLBLD BASED ON 1.73 SQ M-ARVRAT: 100 ML/MIN
GLUCOSE SERPL-MCNC: 91 MG/DL (ref 70–105)
HCT VFR BLD CALC: 35 % (ref 40–54)
HGB BLD-MCNC: 10.3 G/DL (ref 13.3–17.7)
LYMPHOCYTES # BLD AUTO: 0.9 10^3/UL (ref 1–4)
LYMPHOCYTES NFR BLD AUTO: 9 % (ref 12–44)
MANUAL DIFFERENTIAL PERFORMED BLD QL: NO
MCH RBC QN AUTO: 24 PG (ref 25–34)
MCHC RBC AUTO-ENTMCNC: 30 G/DL (ref 32–36)
MCV RBC AUTO: 79 FL (ref 80–99)
MONOCYTES # BLD AUTO: 1 10^3/UL (ref 0–1)
MONOCYTES NFR BLD AUTO: 10 % (ref 0–12)
NEUTROPHILS # BLD AUTO: 8.1 10^3/UL (ref 1.8–7.8)
NEUTROPHILS NFR BLD AUTO: 80 % (ref 42–75)
PLATELET # BLD: 250 10^3/UL (ref 130–400)
PMV BLD AUTO: 9.7 FL (ref 9–12.2)
POTASSIUM SERPL-SCNC: 4 MMOL/L (ref 3.6–5)
PROT SERPL-MCNC: 7.2 GM/DL (ref 6.4–8.2)
SODIUM SERPL-SCNC: 140 MMOL/L (ref 135–145)
WBC # BLD AUTO: 10.1 10^3/UL (ref 4.3–11)

## 2022-05-22 PROCEDURE — 74177 CT ABD & PELVIS W/CONTRAST: CPT

## 2022-05-22 PROCEDURE — 85025 COMPLETE CBC W/AUTO DIFF WBC: CPT

## 2022-05-22 PROCEDURE — 80053 COMPREHEN METABOLIC PANEL: CPT

## 2022-05-22 PROCEDURE — 71260 CT THORAX DX C+: CPT

## 2022-05-22 PROCEDURE — 36415 COLL VENOUS BLD VENIPUNCTURE: CPT

## 2022-05-22 PROCEDURE — 71045 X-RAY EXAM CHEST 1 VIEW: CPT

## 2022-05-22 NOTE — DIAGNOSTIC IMAGING REPORT
EXAMINATION: CT chest, abdomen and pelvis with intravenous

contrast.



TECHNIQUE: Multiple contiguous axial images were obtained through

the chest, abdomen and pelvis after the uneventful administration

of intravenous contrast. All CT scans use one or more of the

following dose optimizing techniques: automated exposure control,

MA and/or KvP adjustment based on patient size and exam type or

iterative reconstruction. 



HISTORY: Right lower rib pain, RUQ pain. 



COMPARISON: None available.



FINDINGS: 



Thyroid: The visualized thyroid gland is normal.



Mediastinum: Heart size is normal without significant pericardial

effusion. The aorta is normal in caliber. No suspicious

lymphadenopathy.



Lungs and airways: The lungs are clear without consolidation,

pleural effusion or pneumothorax.  The airways are normal.



Solid organs: The liver is normal without focal lesion. The

gallbladder is normal. There is no biliary ductal dilation.

Pancreas is normal. Spleen is normal. Adrenal glands are normal.

The kidneys are normal without hydronephrosis.



Bowel: There is a large hiatal hernia. No bowel obstruction.

There is scattered colonic diverticulosis. The appendix is

normal.



Peritoneum: There is no intraperitoneal free fluid or free air.

No suspicious lymphadenopathy. 



Vasculature: Calcification of the aorta without aneurysm.



Musculoskeletal: Mildly displaced fractures of the right lateral

9th and 10th ribs.



Pelvis: The prostate gland is normal. The urinary bladder is

normal.



IMPRESSION:

1. Mildly displaced fractures of the right lateral 9th and 10th

ribs.

2. Large hiatal hernia.

3. Colonic diverticulosis. 



Dictated by: 



  Dictated on workstation # [id number]

## 2022-05-22 NOTE — DIAGNOSTIC IMAGING REPORT
EXAMINATION: Chest 1 view.



HISTORY: Right lower rib pain.



COMPARISON: CT chest 05/22/2022.



FINDINGS: Heart size and pulmonary vasculature are normal. The

lungs are clear without consolidation, pleural effusion or

pneumothorax. Right rib fractures are better seen on same-day CT

of the chest. A likely hiatal hernia.



IMPRESSION: No acute radiographic abnormality in the chest. 



Dictated by: 



  Dictated on workstation # [id number]

## 2022-05-22 NOTE — ED FALL/INJURY
General


Chief Complaint:  Chest Wall


Stated Complaint:  R SIDE RIB PAIN


Nursing Triage Note:  


PT FELL DOWN 3 STAIRS, HIT RIGHT SIDE AND HEARD RIBS "CRUNCH"


Source:  patient, family


Exam Limitations:  no limitations





History of Present Illness


Date Seen by Provider:  May 22, 2022


Time Seen by Provider:  19:12


Initial Comments


55yo male to the ER with complaint of right lower rib pain after a fall down 

about 3  steps early this morning.  He states that he fell due to "wet 

concrete".  HIstory of smoking and daily etoh.  Complains of pleuritic pain and 

feeling the ribs moving.  He also has RUQ abdominal pain.  States he did not hit

his head or have a LOC. no blood thinners. Not on daily aspirin.  No nausea 

currently.  Took 2 extra strength tylenol earlier today.


History of ulcers.





ALl other ROS reviewed and negative except as stated.


Occurred:  this morning


Severity:  moderate


Injuries/Pain Location:  chest, abdomen


Context:  unknown


Loss of Consciousness:  no loss of consciousness


Modifying Factors:  Improves With Immobilization; Worse With Movement


Associated Symptoms (Fall):  Abdominal Pain, Chest Pain





Allergies and Home Medications


Allergies


Coded Allergies:  


     hydrocodone (Verified  Allergy, Unknown, Vomiting, 22)


   STATES TOOK 1 TIME AND VOMITTED AN HOUR LATER





Patient Home Medication List


Home Medication List Reviewed:  Yes


Lansoprazole (Lansoprazole) 15 Mg Capsule.dr, 15 MG PO DAILY, (Reported)


   Entered as Reported by: MARLEN GHOTRA on 20 3055





Review of Systems


Review of Systems


Constitutional:  see HPI


Eyes:  No Symptoms Reported


Ears, Nose, Mouth, Throat:  no symptoms reported


Respiratory:  short of breath


Cardiovascular:  chest pain


Gastrointestinal:  RUQ, abdominal pain


Genitourinary:  no symptoms reported


Musculoskeletal:  no symptoms reported


Skin:  no symptoms reported





All Other Systems Reviewed


Negative Unless Noted:  Yes





Past Medical-Social-Family Hx


Patient Social History


Tobacco Use?:  Yes


Tobacco type used:  Cigarettes


Smoking Status:  Current Everyday Smoker


Use of E-Cig and/or Vaping dev:  No


Substance use?:  No


Alcohol Use?:  Yes


Alcohol type:  Hard Liquor


Alcohol Frequency:  Daily


Pt feels they are or have been:  No





Immunizations Up To Date


Influenza Vaccine Up-to-Date:  No; Not Current





Past Medical History


Surgeries:  Yes (wisdom teeth)


Respiratory:  No


Cardiac:  No


Neurological:  No


Genitourinary:  No


Gastrointestinal:  Yes


Gastroesophageal Reflux


Musculoskeletal:  No


Endocrine:  No


HEENT:  No


Cancer:  No


Psychosocial:  No


Integumentary:  No


Blood Disorders:  No





Family Medical History





Cataracts


  19 MOTHER


Deafness or hearing loss


  19 FATHER


Diabetes mellitus


  19 FATHER


Headache disorder


  19 MOTHER


Diabetes, Other Conditions/Hx





Physical Exam


Vital Signs





Vital Signs - First Documented








 22





 18:36


 


Temp 36.5


 


B/P (MAP) 103/70 (81)


 


Pulse Ox 97


 


O2 Delivery Room Air





Capillary Refill : Less Than 3 Seconds


Height, Weight, BMI


Height: '"


Weight: lbs. oz. kg; 23.00 BMI


Method:


General Appearance:  WD/WN, mild distress


HEENT:  PERRL/EOMI


Neck:  non-tender, full range of motion


Cardiovascular:  regular rate, rhythm


Respiratory:  lungs clear, normal breath sounds, no respiratory distress, no 

accessory muscle use, other (tenderness and crepitance to the Right lower ribs -

can feel the crepitance anteriorly just over the liver - no ecchymoses or 

bruising)


Gastrointestinal:  soft, tenderness (RUQ. normal BS. non distended)


Extremities:  normal range of motion, non-tender, normal inspection


Neurologic/Psychiatric:  alert, normal mood/affect, oriented x 3


Skin:  normal color, warm/dry





Perla Coma Score


Best Eye Response:  (4) Open Spontaneously


Best Verbal Response:  (5) Oriented


Best Motor Response:  (6) Obeys Commands





Progress/Results/Core Measures


Results/Orders


Lab Results





Laboratory Tests








Test


 22


19:40 Range/Units


 


 


White Blood Count


 10.1 


 4.3-11.0


10^3/uL


 


Red Blood Count


 4.36 


 4.30-5.52


10^6/uL


 


Hemoglobin 10.3 L 13.3-17.7  g/dL


 


Hematocrit 35 L 40-54  %


 


Mean Corpuscular Volume 79 L 80-99  fL


 


Mean Corpuscular Hemoglobin 24 L 25-34  pg


 


Mean Corpuscular Hemoglobin


Concent 30 L


 32-36  g/dL





 


Red Cell Distribution Width 20.3 H 10.0-14.5  %


 


Platelet Count


 250 


 130-400


10^3/uL


 


Mean Platelet Volume 9.7  9.0-12.2  fL


 


Immature Granulocyte % (Auto) 1   %


 


Neutrophils (%) (Auto) 80 H 42-75  %


 


Lymphocytes (%) (Auto) 9 L 12-44  %


 


Monocytes (%) (Auto) 10  0-12  %


 


Eosinophils (%) (Auto) 0  0-10  %


 


Basophils (%) (Auto) 1  0-10  %


 


Neutrophils # (Auto)


 8.1 H


 1.8-7.8


10^3/uL


 


Lymphocytes # (Auto)


 0.9 L


 1.0-4.0


10^3/uL


 


Monocytes # (Auto)


 1.0 


 0.0-1.0


10^3/uL


 


Eosinophils # (Auto)


 0.0 


 0.0-0.3


10^3/uL


 


Basophils # (Auto)


 0.1 


 0.0-0.1


10^3/uL


 


Immature Granulocyte # (Auto)


 0.1 


 0.0-0.1


10^3/uL


 


Sodium Level 140  135-145  MMOL/L


 


Potassium Level 4.0  3.6-5.0  MMOL/L


 


Chloride Level 106    MMOL/L


 


Carbon Dioxide Level 19 L 21-32  MMOL/L


 


Anion Gap 15 H 5-14  MMOL/L


 


Blood Urea Nitrogen 11  7-18  MG/DL


 


Creatinine


 0.86 


 0.60-1.30


MG/DL


 


Estimat Glomerular Filtration


Rate 100 


  





 


BUN/Creatinine Ratio 13   


 


Glucose Level 91    MG/DL


 


Calcium Level 9.4  8.5-10.1  MG/DL


 


Corrected Calcium 9.2  8.5-10.1  MG/DL


 


Total Bilirubin 0.4  0.1-1.0  MG/DL


 


Aspartate Amino Transf


(AST/SGOT) 22 


 5-34  U/L





 


Alanine Aminotransferase


(ALT/SGPT) 15 


 0-55  U/L





 


Alkaline Phosphatase 48    U/L


 


Total Protein 7.2  6.4-8.2  GM/DL


 


Albumin 4.3  3.2-4.5  GM/DL








My Orders





Orders - KATI SHOEMAKER MD


Ed Iv/Invasive Line Start (22 19:18)


Cbc With Automated Diff (22 19:18)


Comprehensive Metabolic Panel (22 19:18)


Chest 1 View, Ap/Pa Only (22 19:18)


Ct Chest/Abdomen/Pelvis W (22 19:18)


Morphine  Injection (Morphine  Injection (22 19:18)


Iohexol Injection (Omnipaque 350 Mg/Ml 1 (22 19:30)


Received Contrast (Hold Metformin- Contr (22 19:30)


Ns (Ivpb) (Sodium Chloride 0.9% Ivpb Bag (22 19:30)


Ondansetron Injection (Zofran Injectio (22 19:45)


Tramadol Tablet (Ultram Tablet) (22 20:45)





Medications Given in ED





Current Medications








 Medications  Dose


 Ordered  Sig/Roderick


 Route  Start Time


 Stop Time Status Last Admin


Dose Admin


 


 Iohexol  100 ml  ONCE  ONCE


 IV  22 19:30


 22 19:31 DC 22 20:00


100 ML


 


 Ondansetron HCl  4 mg  ONCE  ONCE


 IVP  22 19:45


 22 19:46 DC 22 19:47


4 MG


 


 Sodium Chloride  100 ml  ONCE  ONCE


 IV  22 19:30


 22 19:31 DC 22 20:00


80 ML








Vital Signs/I&O











 22





 18:36


 


Temp 36.5


 


B/P (MAP) 103/70 (81)


 


Pulse Ox 97


 


O2 Delivery Room Air














Blood Pressure Mean:                    81











Diagnostic Imaging





   Diagonstic Imaging:  CT


Comments


                 [facility] [place], [place]





NAME:   JERRIANABEL LUBA


[facility] REC#:   [id number]


ACCOUNT#:   [id number]


PT STATUS:   REG ER


:   1965


PHYSICIAN:   KATI SHOEMAKER MD


ADMIT DATE:   22/ER


                                   ***Draft***


Date of Exam:22





CT CHEST/ABDOMEN/PELVIS W








EXAMINATION: CT chest, abdomen and pelvis with intravenous


contrast.





TECHNIQUE: Multiple contiguous axial images were obtained through


the chest, abdomen and pelvis after the uneventful administration


of intravenous contrast. All CT scans use one or more of the


following dose optimizing techniques: automated exposure control,


MA and/or KvP adjustment based on patient size and exam type or


iterative reconstruction. 





HISTORY: Right lower rib pain, RUQ pain. 





COMPARISON: None available.





FINDINGS: 





Thyroid: The visualized thyroid gland is normal.





Mediastinum: Heart size is normal without significant pericardial


effusion. The aorta is normal in caliber. No suspicious


lymphadenopathy.





Lungs and airways: The lungs are clear without consolidation,


pleural effusion or pneumothorax.  The airways are normal.





Solid organs: The liver is normal without focal lesion. The


gallbladder is normal. There is no biliary ductal dilation.


Pancreas is normal. Spleen is normal. Adrenal glands are normal.


The kidneys are normal without hydronephrosis.





Bowel: There is a large hiatal hernia. No bowel obstruction.


There is scattered colonic diverticulosis. The appendix is


normal.





Peritoneum: There is no intraperitoneal free fluid or free air.


No suspicious lymphadenopathy. 





Vasculature: Calcification of the aorta without aneurysm.





Musculoskeletal: Mildly displaced fractures of the right lateral


9th and 10th ribs.





Pelvis: The prostate gland is normal. The urinary bladder is


normal.





IMPRESSION:


1. Mildly displaced fractures of the right lateral 9th and 10th


ribs.


2. Large hiatal hernia.


3. Colonic diverticulosis. 





  Dictated on workstation # [id number]








Dict:   22


Trans:   22


PJE [id number]





Interpreted by:     JERAMIE MÁRQUEZ DO


Electronically signed by:








   Litzygonstic Imaging:  Xray


   Plain Films/CT/US/NM/MRI:  chest


Comments


                 [facility] [place], [place]





NAME:   ANABEL GUTHRIE


[facility] REC#:   [id number]


ACCOUNT#:   [id number]


PT STATUS:   REG ER


:   1965


PHYSICIAN:   KATI SHOEMAKER MD


ADMIT DATE:   22/ER


                                  ***Signed***


Date of Exam:22





CHEST 1 VIEW, AP/PA ONLY








EXAMINATION: Chest 1 view.





HISTORY: Right lower rib pain.





COMPARISON: CT chest 2022.





FINDINGS: Heart size and pulmonary vasculature are normal. The


lungs are clear without consolidation, pleural effusion or


pneumothorax. Right rib fractures are better seen on same-day CT


of the chest. A likely hiatal hernia.





IMPRESSION: No acute radiographic abnormality in the chest. 





Dictated by: 





  Dictated on workstation # [id number]








Dict:   22


Trans:   22


PJE [id number]





Interpreted by:     JERAMIE MÁRQUEZ DO


Counseling-Symptomatic:  3-10 Minutes


Follow-up with PCP to:  Discuss Further Options





Departure


Impression





   Primary Impression:  


   Rib fractures


   Qualified Codes:  S22.41XA - Multiple fractures of ribs, right side, initial 

   encounter for closed fracture


Disposition:   HOME, SELF-CARE


Condition:  Stable





Departure-Patient Inst.


Decision time for Depature:  20:40


Referrals:  


MELINDA FANG MD (PCP/Family)


Primary Care Physician


Patient Instructions:  Rib Fracture or Bruised Rib ED





Add. Discharge Instructions:  


Use the incentive spirometer every hour to help take deep breaths.





Take the tramadol once every 6 hours as needed for pain.  This medication can ma

ke you constipated you should be on a daily stool softener while taking 

prescribed pain medicines.





You can also take Tylenol extra strength 2 tablets every 6 hours as needed for 

pain.





Please follow-up with your primary care provider.





Return to the emergency room if you have worsening shortness of breath, fever 

over 101 or any other emergent concerning symptoms.





You should really try to stop smoking.


Scripts


Tramadol HCl (Tramadol HCl) 50 Mg Tablet


50 MG PO Q6H PRN for PAIN, #20 TAB 0 Refills


   Prov: KATI SHOEMAKER MD         22











KATI SHOEMAKER MD         May 22, 2022 19:24

## 2022-10-03 ENCOUNTER — HOSPITAL ENCOUNTER (OUTPATIENT)
Dept: HOSPITAL 75 - RAD | Age: 57
End: 2022-10-03
Attending: FAMILY MEDICINE
Payer: COMMERCIAL

## 2022-10-03 DIAGNOSIS — G45.9: Primary | ICD-10-CM

## 2022-10-03 PROCEDURE — 70498 CT ANGIOGRAPHY NECK: CPT

## 2022-10-03 PROCEDURE — 70496 CT ANGIOGRAPHY HEAD: CPT

## 2022-10-03 NOTE — DIAGNOSTIC IMAGING REPORT
PROCEDURE: 

CT angiography of the head and CT angiography of the neck with

and without contrast.



TECHNIQUE: 

Contiguous noncontrast images were obtained from the skull base

through the vertex. After intravenous contrast administration,

helical CT angiography of the neck was performed. Source data was

reformatted into 3D MIP projections. Delayed post contrast

acquisition was also obtained. Auto Exposure Controls were

utilized during the CT exam to meet ALARA standards for radiation

dose reduction. 



INDICATION:  

Recurrent TIAs with history of stroke in 2020.



COMPARISON: 

No prior studies are available for comparison.



FINDINGS:

The preliminary radiograph of the brain demonstrates the

ventricles and sulci to be within normal limits. No sulcal

effacement or midline shift is identified. No acute intra-axial

or extra-axial hemorrhage is detected. Delayed post contrast

imaging through the brain is without abnormal enhancing lesion.



The CT angiographic portion of the study does show a three-vessel

branching pattern to the aortic arch. The right and left common

carotid arteries are widely patent. There is some mild calcified

plaque in the distal right common carotid artery. Both carotid

bifurcations are unremarkable. The right and left internal

carotid arteries are widely patent. The left vertebral artery is

dominant. Both vertebral arteries are widely patent.



The basilar artery is patent. The right and left posterior

cerebral arteries are widely patent. The M1 and M2 branches of

the right and left middle cerebral arteries are widely patent. No

intracranial stenosis or thromboembolism is seen. No large vessel

occlusion is detected. The right and left anterior cerebral

arteries are widely patent.



IMPRESSION: 

Essentially unremarkable CT angiogram of the head and neck. No

intracranial stenosis, thromboembolism, or large vessel occlusion

is detected.



Dictated by: 



  Dictated on workstation # [id number]